# Patient Record
Sex: MALE | Race: WHITE | URBAN - METROPOLITAN AREA
[De-identification: names, ages, dates, MRNs, and addresses within clinical notes are randomized per-mention and may not be internally consistent; named-entity substitution may affect disease eponyms.]

---

## 2024-08-19 ENCOUNTER — HOSPITAL ENCOUNTER (EMERGENCY)
Age: 39
Discharge: HOME | End: 2024-08-19
Attending: EMERGENCY MEDICINE
Payer: COMMERCIAL

## 2024-08-19 VITALS
OXYGEN SATURATION: 95 % | HEART RATE: 89 BPM | RESPIRATION RATE: 18 BRPM | SYSTOLIC BLOOD PRESSURE: 119 MMHG | DIASTOLIC BLOOD PRESSURE: 84 MMHG | TEMPERATURE: 97.5 F | BODY MASS INDEX: 44.15 KG/M2 | WEIGHT: 308.42 LBS | HEIGHT: 70 IN

## 2024-08-19 DIAGNOSIS — S81.811A LACERATION OF RIGHT LOWER LEG WITHOUT COMPLICATION, INITIAL ENCOUNTER: Primary | ICD-10-CM

## 2024-08-19 PROCEDURE — 90715 TDAP VACCINE 7 YRS/> IM: CPT | Performed by: EMERGENCY MEDICINE

## 2024-08-19 PROCEDURE — 6360000002 HC RX W HCPCS: Performed by: EMERGENCY MEDICINE

## 2024-08-19 PROCEDURE — 90471 IMMUNIZATION ADMIN: CPT | Performed by: EMERGENCY MEDICINE

## 2024-08-19 PROCEDURE — 99284 EMERGENCY DEPT VISIT MOD MDM: CPT

## 2024-08-19 PROCEDURE — 12002 RPR S/N/AX/GEN/TRNK2.6-7.5CM: CPT

## 2024-08-19 RX ADMIN — TETANUS TOXOID, REDUCED DIPHTHERIA TOXOID AND ACELLULAR PERTUSSIS VACCINE, ADSORBED 0.5 ML: 5; 2.5; 8; 8; 2.5 SUSPENSION INTRAMUSCULAR at 19:58

## 2024-08-19 ASSESSMENT — PAIN - FUNCTIONAL ASSESSMENT
PAIN_FUNCTIONAL_ASSESSMENT: ACTIVITIES ARE NOT PREVENTED
PAIN_FUNCTIONAL_ASSESSMENT: 0-10
PAIN_FUNCTIONAL_ASSESSMENT: NONE - DENIES PAIN

## 2024-08-19 ASSESSMENT — PAIN DESCRIPTION - FREQUENCY: FREQUENCY: CONTINUOUS

## 2024-08-19 ASSESSMENT — PAIN DESCRIPTION - LOCATION: LOCATION: LEG

## 2024-08-19 ASSESSMENT — LIFESTYLE VARIABLES
HOW OFTEN DO YOU HAVE A DRINK CONTAINING ALCOHOL: NEVER
HOW MANY STANDARD DRINKS CONTAINING ALCOHOL DO YOU HAVE ON A TYPICAL DAY: PATIENT DOES NOT DRINK

## 2024-08-19 ASSESSMENT — PAIN DESCRIPTION - DIRECTION: RADIATING_TOWARDS: NON RADIATING

## 2024-08-19 ASSESSMENT — PAIN DESCRIPTION - PAIN TYPE: TYPE: ACUTE PAIN

## 2024-08-19 ASSESSMENT — PAIN DESCRIPTION - DESCRIPTORS: DESCRIPTORS: ACHING

## 2024-08-19 ASSESSMENT — PAIN DESCRIPTION - ORIENTATION: ORIENTATION: RIGHT;LOWER

## 2024-08-19 ASSESSMENT — PAIN SCALES - GENERAL: PAINLEVEL_OUTOF10: 3

## 2024-08-19 NOTE — ED PROVIDER NOTES
[facility] EMERGENCY DEPT VISIT      Patient Identification  John Evangelista is a 38 y.o. male.    Chief Complaint   Laceration (Patient hit his right calf on the corner of a pane of glass. Laceration with slight bleeding present. Tetanus >5 years. )      History of Present Illness:    History was obtained from patient.  Limitations to history:none  This is a  38 y.o. male who presents ambulatory  to the ED with complaints of a laceration to his right lower leg. He cut it on a piece of glass that he accidentally ran into. Last tetanus is not known. No loss of sensation or loss of movement of leg or toes. He has been ambulatory since..     History reviewed. No pertinent past medical history.    History reviewed. No pertinent surgical history.      Current Facility-Administered Medications:     tetanus-diphth-acell pertussis (BOOSTRIX) injection 0.5 mL, 0.5 mL, IntraMUSCular, Once, Adia Li MD  No current outpatient medications on file.    No Known Allergies    Social History     Socioeconomic History    Marital status: Single     Spouse name: Not on file    Number of children: Not on file    Years of education: Not on file    Highest education level: Not on file   Occupational History    Not on file   Tobacco Use    Smoking status: Never    Smokeless tobacco: Never   Substance and Sexual Activity    Alcohol use: Not Currently    Drug use: Never    Sexual activity: Not on file   Other Topics Concern    Not on file   Social History Narrative    Not on file     Social Determinants of Health     Financial Resource Strain: Not on file   Food Insecurity: Not on file   Transportation Needs: Not on file   Physical Activity: Not on file   Stress: Not on file   Social Connections: Not on file   Intimate Partner Violence: Not on file   Housing Stability: Not on file       Nursing Notes Reviewed      PHYSICAL EXAM:  GENERAL APPEARANCE: John Evangelista is in no acute respiratory distress. Awake and alert.  VITAL SIGNS:   ED

## 2024-08-19 NOTE — DISCHARGE INSTRUCTIONS
Tylenol and/or motrin for pain. Antibiotic ointment twice daily for 5 days. You may shower but no baths or swimming. Do not submerge wound in water. Return for fever, redness, swelling, purulent drainage from wound. Suture removal in 10-12 days. Keep clean dry and covered when outside or in dirty environments.

## 2024-08-19 NOTE — ED TRIAGE NOTES
Patient offered wheelchair to exam room and declined. Ambulatory with steady gait to Room 5. He reports hit his leg on the edge of a panel of glass about 45 minutes prior to arrival and has laceration to right calf with oozing blood. Denies other injuries. Respirations easy & regular, skin w/d, cap refill brisk. Dr. Li in room to examine patient.

## 2024-08-20 NOTE — ED NOTES
Laceration closed with 16 sutures by Dr. Li. Antibiotic ointment, telfa pad and kerlex applied to wound. Discharge instructions reviewed with patient and verbalized understanding, denies further questions and successful teach back occurred. Offered wheelchair for discharge and declined. Discharged ambulatory with steady gait to [facility]. Written discharge instructions provided to patient.

## 2024-08-20 NOTE — ED NOTES
Patient's wound cleansed and irrigated with chlorhexidine and NSS, rinsed and dried. Dr. Li in room to suture patient.